# Patient Record
Sex: MALE | Race: WHITE | Employment: FULL TIME | URBAN - METROPOLITAN AREA
[De-identification: names, ages, dates, MRNs, and addresses within clinical notes are randomized per-mention and may not be internally consistent; named-entity substitution may affect disease eponyms.]

---

## 2019-01-12 ENCOUNTER — HOSPITAL ENCOUNTER (OUTPATIENT)
Age: 69
Discharge: HOME | End: 2019-01-12
Attending: EMERGENCY MEDICINE
Payer: COMMERCIAL

## 2019-01-12 ENCOUNTER — APPOINTMENT (OUTPATIENT)
Dept: GENERAL RADIOLOGY | Age: 69
End: 2019-01-12
Attending: EMERGENCY MEDICINE
Payer: COMMERCIAL

## 2019-01-12 VITALS
TEMPERATURE: 98 F | WEIGHT: 181 LBS | HEIGHT: 73 IN | DIASTOLIC BLOOD PRESSURE: 90 MMHG | SYSTOLIC BLOOD PRESSURE: 179 MMHG | BODY MASS INDEX: 23.99 KG/M2 | OXYGEN SATURATION: 98 % | RESPIRATION RATE: 16 BRPM | HEART RATE: 63 BPM

## 2019-01-12 DIAGNOSIS — S82.891A CLOSED FRACTURE OF RIGHT ANKLE, INITIAL ENCOUNTER: Primary | ICD-10-CM

## 2019-01-12 PROCEDURE — 29515 APPLICATION SHORT LEG SPLINT: CPT

## 2019-01-12 PROCEDURE — 99204 OFFICE O/P NEW MOD 45 MIN: CPT

## 2019-01-12 PROCEDURE — 73610 X-RAY EXAM OF ANKLE: CPT | Performed by: EMERGENCY MEDICINE

## 2019-01-12 PROCEDURE — 99203 OFFICE O/P NEW LOW 30 MIN: CPT

## 2019-01-12 RX ORDER — OMEPRAZOLE 20 MG/1
CAPSULE, DELAYED RELEASE ORAL
Refills: 2 | COMMUNITY
Start: 2018-10-30

## 2019-01-12 RX ORDER — FENOFIBRATE 134 MG/1
CAPSULE ORAL
Refills: 1 | COMMUNITY
Start: 2018-11-12

## 2019-01-12 RX ORDER — NAPROXEN 500 MG/1
500 TABLET ORAL 2 TIMES DAILY WITH MEALS
Qty: 20 TABLET | Refills: 0 | Status: SHIPPED | OUTPATIENT
Start: 2019-01-12 | End: 2019-01-19

## 2019-01-12 RX ORDER — HYDROCODONE BITARTRATE AND ACETAMINOPHEN 5; 325 MG/1; MG/1
1 TABLET ORAL
Qty: 6 TABLET | Refills: 0 | Status: SHIPPED | OUTPATIENT
Start: 2019-01-12 | End: 2019-01-13

## 2019-01-12 RX ORDER — HYDROCHLOROTHIAZIDE 25 MG/1
TABLET ORAL
Refills: 1 | COMMUNITY
Start: 2018-12-24

## 2019-01-12 RX ORDER — LEVOTHYROXINE SODIUM 112 UG/1
TABLET ORAL
Refills: 0 | COMMUNITY
Start: 2019-01-11

## 2019-01-12 NOTE — ED INITIAL ASSESSMENT (HPI)
C/opain and swelling Rt ankle. Slipped and fell while shoveling the snow Foot went underneath the other leg.  Pedal pulse present

## 2019-01-12 NOTE — ED PROVIDER NOTES
Patient Seen in: [facility] In [address] [place] [address]    History   Patient presents with:  Lower Extremity Injury (musculoskeletal)    Stated Complaint: ankle injury    HPI    HPI: Beryle Hidden is a 77year old male who presents after an injury to 98 %   O2 Device None (Room air)       Current:BP (!) 179/90   Pulse 63   Temp 98 °F (36.7 °C) (Oral)   Resp 16   Ht 185.4 cm (6' 1\")   Wt 82.1 kg   SpO2 98%   BMI 23.88 kg/m²         Physical Exam      MENTAL STATUS: Alert, oriented, and cooperative.  No 0    naproxen 500 MG Oral Tab  Take 1 tablet (500 mg total) by mouth 2 (two) times daily with meals for 7 days.   Qty: 20 tablet Refills: 0

## (undated) NOTE — LETTER
Date & Time: 1/12/2019, 3:47 PM  Patient: Pardeep Whitehead  Encounter Provider(s):    Norma Goff MD       To Whom It May Concern: Pardeep Whitehead was seen and treated in our department on 1/12/2019.  He cannot travel, which will require a change in his